# Patient Record
Sex: MALE | Race: AMERICAN INDIAN OR ALASKA NATIVE | URBAN - NONMETROPOLITAN AREA
[De-identification: names, ages, dates, MRNs, and addresses within clinical notes are randomized per-mention and may not be internally consistent; named-entity substitution may affect disease eponyms.]

---

## 2017-08-22 ENCOUNTER — TRANSFERRED RECORDS (OUTPATIENT)
Dept: HEALTH INFORMATION MANAGEMENT | Facility: HOSPITAL | Age: 32
End: 2017-08-22

## 2017-10-03 ENCOUNTER — TRANSFERRED RECORDS (OUTPATIENT)
Dept: HEALTH INFORMATION MANAGEMENT | Facility: HOSPITAL | Age: 32
End: 2017-10-03

## 2017-10-04 ENCOUNTER — TRANSFERRED RECORDS (OUTPATIENT)
Dept: HEALTH INFORMATION MANAGEMENT | Facility: HOSPITAL | Age: 32
End: 2017-10-04

## 2017-10-04 ENCOUNTER — HOSPITAL ENCOUNTER (INPATIENT)
Facility: HOSPITAL | Age: 32
LOS: 5 days | Discharge: HOME | End: 2017-10-09
Attending: PSYCHIATRY & NEUROLOGY | Admitting: PSYCHIATRY & NEUROLOGY
Payer: MEDICAID

## 2017-10-04 DIAGNOSIS — F20.1 DISORGANIZED SCHIZOPHRENIA (H): Primary | ICD-10-CM

## 2017-10-04 PROBLEM — F29 PSYCHOSIS (H): Status: ACTIVE | Noted: 2017-10-04

## 2017-10-04 PROCEDURE — 99223 1ST HOSP IP/OBS HIGH 75: CPT | Performed by: NURSE PRACTITIONER

## 2017-10-04 PROCEDURE — 12400000 ZZH R&B MH

## 2017-10-04 PROCEDURE — 25000132 ZZH RX MED GY IP 250 OP 250 PS 637: Performed by: NURSE PRACTITIONER

## 2017-10-04 RX ORDER — OLANZAPINE 10 MG/2ML
10 INJECTION, POWDER, FOR SOLUTION INTRAMUSCULAR 3 TIMES DAILY PRN
Status: DISCONTINUED | OUTPATIENT
Start: 2017-10-04 | End: 2017-10-09 | Stop reason: HOSPADM

## 2017-10-04 RX ORDER — ARIPIPRAZOLE 10 MG/1
10 TABLET ORAL DAILY
Status: DISCONTINUED | OUTPATIENT
Start: 2017-10-04 | End: 2017-10-05

## 2017-10-04 RX ORDER — TRAZODONE HYDROCHLORIDE 50 MG/1
50 TABLET, FILM COATED ORAL
Status: DISCONTINUED | OUTPATIENT
Start: 2017-10-04 | End: 2017-10-09 | Stop reason: HOSPADM

## 2017-10-04 RX ORDER — DIPHENHYDRAMINE HCL 50 MG
50 CAPSULE ORAL EVERY 6 HOURS PRN
Status: DISCONTINUED | OUTPATIENT
Start: 2017-10-04 | End: 2017-10-08

## 2017-10-04 RX ORDER — RISPERIDONE 2 MG/1
4 TABLET ORAL AT BEDTIME
Status: DISCONTINUED | OUTPATIENT
Start: 2017-10-04 | End: 2017-10-04

## 2017-10-04 RX ORDER — BISACODYL 10 MG
10 SUPPOSITORY, RECTAL RECTAL DAILY PRN
Status: DISCONTINUED | OUTPATIENT
Start: 2017-10-04 | End: 2017-10-09 | Stop reason: HOSPADM

## 2017-10-04 RX ORDER — HYDROXYZINE HYDROCHLORIDE 25 MG/1
25-50 TABLET, FILM COATED ORAL EVERY 4 HOURS PRN
Status: DISCONTINUED | OUTPATIENT
Start: 2017-10-04 | End: 2017-10-09 | Stop reason: HOSPADM

## 2017-10-04 RX ORDER — ACETAMINOPHEN 325 MG/1
650 TABLET ORAL EVERY 4 HOURS PRN
Status: DISCONTINUED | OUTPATIENT
Start: 2017-10-04 | End: 2017-10-09 | Stop reason: HOSPADM

## 2017-10-04 RX ORDER — OLANZAPINE 10 MG/1
10 TABLET ORAL 3 TIMES DAILY PRN
Status: DISCONTINUED | OUTPATIENT
Start: 2017-10-04 | End: 2017-10-09 | Stop reason: HOSPADM

## 2017-10-04 RX ORDER — ALUMINA, MAGNESIA, AND SIMETHICONE 2400; 2400; 240 MG/30ML; MG/30ML; MG/30ML
30 SUSPENSION ORAL EVERY 4 HOURS PRN
Status: DISCONTINUED | OUTPATIENT
Start: 2017-10-04 | End: 2017-10-09 | Stop reason: HOSPADM

## 2017-10-04 RX ADMIN — RISPERIDONE 3 MG: 2 TABLET, FILM COATED ORAL at 20:21

## 2017-10-04 RX ADMIN — ARIPIPRAZOLE 10 MG: 10 TABLET ORAL at 15:33

## 2017-10-04 RX ADMIN — OLANZAPINE 10 MG: 10 TABLET, FILM COATED ORAL at 11:44

## 2017-10-04 RX ADMIN — RISPERIDONE 3 MG: 2 TABLET, FILM COATED ORAL at 13:52

## 2017-10-04 ASSESSMENT — ACTIVITIES OF DAILY LIVING (ADL)
BATHING: 0-->INDEPENDENT
FALL_HISTORY_WITHIN_LAST_SIX_MONTHS: NO
AMBULATION: 0-->INDEPENDENT
ORAL_HYGIENE: INDEPENDENT
TOILETING: 0-->INDEPENDENT
GROOMING: INDEPENDENT
DRESS: INDEPENDENT;SCRUBS (BEHAVIORAL HEALTH)
DRESS: 0-->INDEPENDENT
RETIRED_EATING: 0-->INDEPENDENT
SWALLOWING: 0-->SWALLOWS FOODS/LIQUIDS WITHOUT DIFFICULTY
RETIRED_COMMUNICATION: 0-->UNDERSTANDS/COMMUNICATES WITHOUT DIFFICULTY
COGNITION: 0 - NO COGNITION ISSUES REPORTED
TRANSFERRING: 0-->INDEPENDENT
LAUNDRY: UNABLE TO COMPLETE

## 2017-10-04 NOTE — PLAN OF CARE
Social Service Psychosocial Assessment  Presenting Problem:   Patient was brought to the ED by PO from [facility]. Pt has been having auditory hallucinations for the past 2 years. Voices are telling him to do harmful things to others and that he is going to die. Pt had monopoly money on him as part of his delusions. Per ED notes from what pt told his PO- pt counts the monopoly money because someone killed the monopoly man, but no one can tell anyone that he is actually alive. Pt told ED that he understands the voices aren't real but feels he has to follow the instructions, pt denied SI.   Attempted to assess pt this afternoon and was unsuccessful. All other information was taken from previous records and pt's chart. Did have pt sign multiple releases for PO, , [facility], and CADT.    Marital Status:   Single   Spouse / Children:    4 children and one on the way- All live with their mothers. Previously states the oldest lives in [place] and the youngest two live in [place].  Psychiatric [place] HX: Pt was hospitalized here in March of 2015. 3 previous hospitalizations, one being at [facility] 3 years ago- Admits to past civil commitments. Previously diagnosed on 8-22 with social anxiety, schizoaffective disorder, ETOH, and cannabis use. History of command hallucinations that tell him to harm himself and others. History of overdosing on pills. ED notes state pt stated everything started when he was 12 years old and he pretended he had an invisible friend.   Suicide Risk Assessment: Pt denies SI on admission- Was admitted with voices telling him to do harmful things to others and that he is going to die. Pt denies any history of suicide attempts. Denies any family history of suicide. Denies SI today.   Access to Lethal Means (explain):   Denies access to lethal means  Family Psych HX:   None reported   A & Ox:   x3  Medication Adherence:   Unknown   Medical Issues:   See H&P  Visual  Motor Functioning:   Okay    Communication Skills /Needs:   Okay   Ethnicity:    or      Spirituality/Hindu Affiliation:    Native spirituality   Clergy Request:   No   History:   None reported   Living Situation: Has been staying with his mom in [place]   ADL s:  Independent   Education:  Graduated HS  Financial Situation:   Unknown   Occupation:  Unemployed   Leisure & Recreation:  Unknown   Childhood History:   Grew up in [place], mostly with his father and 3 siblings. States that he had a good childhood.  Trauma Abuse HX:   None reported   Relationship / Sexuality:   None reported   Substance Use/ Abuse:  Admits to daily marijuana use. History of alcohol use- last drink being 2 years ago.   Chemical Dependency [facility]  Legal Issues: Currently on probation. Pt was in [facility] for 4 months over the summer for burglary and was released 3 weeks ago. Pt's PO reports pt's charge was a felony simple robbery and trespassing. Pt has a history of multiple trespassing charges. Pt absconded after he was charged and was missing for a number of months until found 5 months ago and then was put in [facility].     Significant Life Events:   None reported   Strengths:   Ability to communicate, family support  Challenges /Limitation:   Lack of insight, Mental health   Patient Support Contact (Include name, relationship, number, and summary of conversation):   Pt has a release signed for his mother Daya Carrera at [phone]- ED notes state pt's mother is his legal guardian, if so will get legal paperwork from pt's mother. Called pt's PO Sreedhar [place] and  Yennifer to get fax numbers to send them signed MONICA's- both stated when they receive the MONICA they will call back.   Interventions:       Community-Based Programs- [facility]     Medical/Dental Care- PCP- [facility]- Dr. Colt REESE Evaluation/Rule 25/Aftercare- Would benefit  from    Medication Management- [facility]- DA done 8-22- Dr. Cooper?     Individual Therapy- [facility]- Oziel     Case Management- [facility]- Yennifer Tavera/ P.O.- [facility]- Sreedhar Sidhua- [phone]/ [phone]    Insurance Coverage- None listed, Pt financial attempted to meet with pt this afternoon      Suicide Risk Assessment- Pt denies SI on admission- Was admitted with voices telling him to do harmful things to others and that he is going to die. Pt denies any history of suicide attempts. Denies any family history of suicide. Denies SI today.     High Risk Safety Plan- Talk to supports; Call crisis lines; Go to local ER if feeling suicidal.

## 2017-10-04 NOTE — H&P
"Psychiatric Eval/H&P  Patient Name: Shay Carrera   YOB: 1985  Age: 32 year old  [id number]    Primary Physician: No primary care provider on file.   Completed By: Bryanna Justice NP     CC: \"I don't know\"    HPI   Shay Carrera is a 32 year old single  male who presented via [facility] ER with his PO due to his ongoing bizarre behaviors. Shay has been disorganized behavior, delusional beliefs and hallucinations. Shay believes his father or grandfather own the [employer]. He carries around large amounts of monopoly money and believes that the monopoly man has been killed. Shay is recently out of senior care after serving time for [employer] and [employer]. He lives with his mother who is also reportedly his guardian. Shay is quite sleepy due to getting a medication earlier in the day. This information is taken from his chart. Shay was able to provide some information and utter agreement or clarify briefly with the information.      SPECIFIC SYMPTOM HISTORY  Sleep:no issues .  Recent appetite change: No.  Recent weight change: No.  Special diet: No.  Other nutritional concerns: no.  Psychotic symptoms (subjective): Frequent hallucinations..delusions, auditory hallucinations and visual hallucinationsendorses symptoms of psychosis including delusions believes his father owns a basketball team, hallucinations auditory hallucinations and disorganized behavior     [facility]     Past Psychiatric History: He has been hospitalized in this facility in 2015, he has been in other facilities as well. He may have been under commitment at one time. His past history is significant for schizophrenia and catatonia.  He is currently on Risperdal and has been on zyprexa in the past. Shay does say he does not think the risperdal is working anymore. Shay denies a history of abuse.     Social History: Shay grew up in the [place]. He graduated from high school. He is unemployed. He is on probation. " "He has no  history. He has four children with a fifth one on the way. The children live with their mothers. Cliff tells me he has no contact with his children.  Education, school, occupation, social/peer relations, hobbies/recreational interests,  history, legal history,      Chemical Use History: no significant substance abuse history is reported by cliff. He tells me he has never been to treatment     Family Psychiatric History: Cliff denies any family mental health history, nor could one be verified in collateral data.         Medical History and ROS  Prescription Medications as of 10/4/2017             RisperiDONE (RISPERDAL PO) Take 2 mg by mouth daily Daily in the morning    RisperiDONE (RISPERDAL PO) Take 4 mg by mouth daily Daily at HS    OLANZapine zydis (ZYPREXA) 20 MG disintegrating tablet Take 1 tablet (20 mg) by mouth At Bedtime      Facility Administered Medications as of 10/4/2017             hydrOXYzine (ATARAX) tablet 25-50 mg Take 1-2 tablets (25-50 mg) by mouth every 4 hours as needed for anxiety    acetaminophen (TYLENOL) tablet 650 mg Take 2 tablets (650 mg) by mouth every 4 hours as needed for mild pain    alum & mag hydroxide-simethicone (MYLANTA ES/MAALOX  ES) suspension 30 mL Take 30 mLs by mouth every 4 hours as needed for indigestion    magnesium hydroxide (MILK OF MAGNESIA) suspension 30 mL Take 30 mLs by mouth nightly as needed for constipation    bisacodyl (DULCOLAX) Suppository 10 mg Place 1 suppository (10 mg) rectally daily as needed for constipation    traZODone (DESYREL) tablet 50 mg Take 1 tablet (50 mg) by mouth nightly as needed for sleep    OLANZapine (zyPREXA) tablet 10 mg Take 1 tablet (10 mg) by mouth 3 times daily as needed for agitation (associated with psychosis or odalys)    Linked Group 1:  \"Or\" Linked Group Details     OLANZapine (zyPREXA) injection 10 mg Inject 10 mg into the muscle 3 times daily as needed for agitation (associated with psychosis or " "odalys)    Linked Group 1:  \"Or\" Linked Group Details     nicotine polacrilex (NICORETTE) gum 2-4 mg Place 1-2 each (2-4 mg) inside cheek every hour as needed for other (nicotine withdrawal symptoms)    risperiDONE (risperDAL) tablet 3 mg Take 3 mg by mouth daily    risperiDONE (risperDAL) tablet 4 mg Take 2 tablets (4 mg) by mouth At Bedtime          Allergies   Allergen Reactions     Haldol [Haloperidol] Other (See Comments)     Severe dystonic reaction     Sulfa Drugs      No past medical history on file.  No past surgical history on file.      Physical Exam    Constitutional: oriented to person, place, and time.  appears well-developed and well-nourished.   HENT:   Head: Normocephalic and atraumatic.   Right Ear: External ear normal.   Left Ear: External ear normal.   Nose: Nose normal.   Mouth/Throat: Oropharynx is clear and moist. No oropharyngeal exudate.   Eyes: Conjunctivae and EOM are normal. Pupils are equal, round, and reactive to light. Right eye exhibits no discharge. Left eye exhibits no discharge. No scleral icterus.   Neck: Normal range of motion. Neck supple. No JVD present. No tracheal deviation present. No thyromegaly present.   Cardiovascular: Normal rate, regular rhythm, normal heart sounds and intact distal pulses. Exam reveals no gallop and no friction rub.   No murmur heard.  Pulmonary/Chest: Effort normal and breath sounds normal. No stridor. No respiratory distress.  no wheezes. no rales. no tenderness.   Abdominal: Soft. Bowel sounds are normal.  no distension and no mass. There is no tenderness. There is no rebound and no guarding.  Skin: Dry, intact, no open areas, rashes, moles of concern    Review of Systems:  Constitution: No weight loss, fever, night sweats  Skin: No rashes, pruritus or open wounds  Neuro: No headaches or seizure activity.  Psych:  See HPI  Eyes: No vision changes.  ENT: No problems chewing or swallowing.   Musculoskeletal: No muscle pain, joint pain or swelling " "  Respiratory: No cough or dyspnea  Cardiovascular:  No chest pain,  palpitations or fainting  Gastrointestinal:  No abdominal pain, nausea, vomiting or change in bowel habits         MSE/PSYCH  PSYCHIATRIC EXAM  /78  Pulse 81  Temp 96.8  F (36  C) (Tympanic)  Resp 14  Ht 1.778 m (5' 10\")  Wt 76.2 kg (168 lb)  SpO2 99%  BMI 24.11 kg/m2  -Appearance/Behavior:   No apparent distress and Malodorous  {attitude:cooperative  -Motor: normal or unremarkable.  -Gait: Normal.    -Abnormal involuntary movements: none.  -Mood: grandiose.  -Affect: pt is sleepy.  -Speech: Normal slightly delayed due to sleepiness.                 -Thought process/associations: Linear.  -Thought content: bizarre delusions  Per er report, patient is able to report delusional beliefs.  -Perceptual disturbances: Frequent hallucinations..              -Suicidal/Homicidal Ideation: denies SI/HI  -Judgment: Poor.  -Insight: Fair.  *Orientation: time and person.  *Memory: unable to assess.  *Attention: impaired  *Language: fluent, no aphasias, able to repeat phrases and name objects. Vocab intact.  *Fund of information:  not assessed.  *Cognitive functioning estimate: 1 - slightly impaired.     Labs: labs are unremarkable     Assessment/Impression: This is a 32 year old yo male with a history of schizophrenia. He is a questionable historian and there are some inconsistent reports from what he told the ER and what his PO and mother were able to verify. Shay is cooperative but quite lethargic after getting medicated. He does tell me that he does not think risperdal is working well. He is receptive to changing medications.   Educated regarding medication indications, risks, benefits, side effects, contraindications and possible interactions. Verbally expressed understanding.     DX: schizophrenia     Plan:  Admit to Unit: [address] [place] [address]  Attending: Bryanna Justice, SRI-CNP  Patient is: Voluntary  Monitor for target symptoms: decreased " psychosis  Provide a safe environment and therapeutic milieu.   Re-Start/Start: Start abilify  Taper risperdal    Anticipated length of stay: 3-5 days       Bryanna Justice, APRN-CNP

## 2017-10-04 NOTE — PROGRESS NOTES
List items sent to safe:zero cash, sunglassesAll other belongings put in assigned cubby in belongings room.     I have reviewed my belongings list on admission and verify that it is correct.     Patient signature_______________________________    Second staff witness (if patient unable to sign) ______________________________       I have received all my belongings at discharge.    Patient signature________________________________    Etkjgaw83/4/2017  6:47 AM

## 2017-10-04 NOTE — PROGRESS NOTES
List items sent to safe:zero cash no cell phoneAll other belongings put in assigned cubby in belongings room.     I have reviewed my belongings list on admission and verify that it is correct.     Patient signature_______________________________    Second staff witness (if patient unable to sign) ______________________________       I have received all my belongings at discharge.    Patient signature________________________________    Ytzlxty73/4/2017  6:41 AM

## 2017-10-04 NOTE — PLAN OF CARE
Problem: Patient Care Overview  Goal: Team Discussion  Team Plan:   BEHAVIORAL TEAM DISCUSSION     Participants:  Sixto Wells NP, Charity Rodriguez NP, Bryanna Justice NP, Isa Patel [facility], Bhavna SORTOW, Gloria Rosenbaum RN, Maria Luisa Fletcher RN. Eula [facility], [facility] OT, Carol Childs OT   Progress: new patient  Continued Stay Criteria/Rationale: Psychotic, Looking at starting a long acting injection. Auditory Hallucinations.  Medical/Physical: none known.   Precautions:   Behavioral Orders   Procedures     Code 1 - Restrict to Unit     Routine Programming       As clinically indicated     Self Injury Precaution     Status 15       Every 15 minutes.     Plan: Provided to assess. Plan to contact the mother for collaborative information. Plan to contact PO as well.   Rationale for change in precautions or plan: ok to move out of the back.

## 2017-10-04 NOTE — PLAN OF CARE
"ADMISSION NOTE    Reason for admission Hallucinations/Voices to harm self/others - non-med compliance.  Safety concerns Continues to hear voices.  Risk for or history of violence history of violence - has been calm/polite since admit.   Full skin assessment: skin clear - does have two word tattoo across chest    Patient arrived on unit from [place], [place] accompanied by [facility] on 10/4/2017  6:21 AM.   Status on arrival: Calm, polite - does admit to continues hallucinations  /78  Pulse 81  Temp 96.8  F (36  C) (Tympanic)  Resp 14  Ht 1.778 m (5' 10\")  Wt 76.2 kg (168 lb)  SpO2 99%  BMI 24.11 kg/m2  Patient given tour of unit and Welcome to  unit papers given to patient, wanding completed, belongings inventoried, and admission assessment completed.   Patient's legal status on arrival is Voluntary. Appropriate legal rights discussed with and copy given to patient. Patient Bill of Rights discussed with and copy given to patient.   Patient denies SI, HI, and thoughts of self harm and contracts for safety while on unit.      Mariann Bell  10/4/2017  6:55 AM          "

## 2017-10-04 NOTE — PROGRESS NOTES
10/04/17 0650   Patient Belongings   Did you bring any home meds/supplements to the hospital?  Yes   Disposition of meds  Sent to security/pharmacy per site process   Patient Belongings clothing   Disposition of Belongings soxs,top,cap,shoes,shorts,sunglasses,momopoly money,2 necklace   Belongings Search Yes   Clothing Search Yes   Second Staff afshan   General Info Comment n/a    List items sent to safe:zero cashAll other belongings put in assigned cubby in belongings room.     I have reviewed my belongings list on admission and verify that it is correct.     Patient signature_______________________________    Second staff witness (if patient unable to sign) ______________________________       I have received all my belongings at discharge.    Patient signature________________________________    Jessica Palacios  10/4/2017  6:52 AM

## 2017-10-04 NOTE — PLAN OF CARE
0621--ambulatory to unit accompanied by security and two [employer] officers. Calm , pleasant, cooperative with changing into scrubs. Skin check done with security Babak present. Skin clear; has two large words tattoos on his chest. Pt had a large handfull of Monopoly money in his hand on admission.

## 2017-10-04 NOTE — PLAN OF CARE
Face to face end of shift report received from Maria Luisa LINDO RN. Rounding completed. Patient observed in -ICU in room laying in bed.     Griselda Eckert  10/4/2017  8:30 AM

## 2017-10-04 NOTE — IP AVS SNAPSHOT
[facility]    [address] [place] [address]    Phone:  [phone]    Fax:  [phone]                                       After Visit Summary   10/4/2017    Shay Carrera    MRN: [id number]           After Visit Summary Signature Page     I have received my discharge instructions, and my questions have been answered. I have discussed any challenges I see with this plan with the nurse or doctor.    ..........................................................................................................................................  Patient/Patient Representative Signature      ..........................................................................................................................................  Patient Representative Print Name and Relationship to Patient    ..................................................               ................................................  Date                                            Time    ..........................................................................................................................................  Reviewed by Signature/Title    ...................................................              ..............................................  Date                                                            Time

## 2017-10-04 NOTE — PLAN OF CARE
Face to face end of shift report received from EROS Liz. Rounding completed. Patient observed in bed.     Ruthie Lechuga  10/4/2017  3:39 PM

## 2017-10-04 NOTE — IP AVS SNAPSHOT
MRN:[id number]                      After Visit Summary   10/4/2017    Shay Carrera    MRN: [id number]           Thank you!     Thank you for choosing [facility] for your care. Our goal is always to provide you with excellent care. Hearing back from our patients is one way we can continue to improve our services. Please take a few minutes to complete the written survey that you may receive in the mail after you visit with us. Thank you!        Patient Information     Date Of Birth          1985        Designated Caregiver       Most Recent Value    Caregiver    Will someone help with your care after discharge? yes    Name of designated caregiver Mom    Phone number of caregiver [phone]    Caregiver address [place], [place]      About your hospital stay     You were admitted on:  October 4, 2017 You last received care in the:  [facility]    You were discharged on:  October 9, 2017       Who to Call     For medical emergencies, please call 911.  For non-urgent questions about your medical care, please call your primary care provider or clinic, None          Attending Provider     Provider Specialty    Sreedhar Hall MD Psychiatry    Bryanna Justice NP Licensed Mental Health       Primary Care Provider    Physician No Ref-Primary      Further instructions from your care team       Behavioral Discharge Planning and Instructions    Summary: Shay was admitted to  with Hallucinations/Voices telling him to harm self/others - non-med compliance    Main Diagnosis:  schizophrenia    Major Treatments, Procedures and Findings: Stabilize with medications, connect with community programs.    Symptoms to Report: feeling more aggressive, increased confusion, losing more sleep, mood getting worse or thoughts of suicide    Lifestyle Adjustment: Take all medications as prescribed, meet with doctor/ medication provider, out patient therapist, , and [facility] worker as scheduled. Abstain from  alcohol or any unprescribed drugs.    Psychiatry Follow-up:     [facility]  Therapy- Oziel- October 25th @1:30pm  PCP- Dr. Gregory- October 9th @9am, left message to reschedule    - Yennifer Liang- As arranged [phone]   [address] [facility] [address]  Phone: [phone] Fax: [phone]    [facility] [address]- Fridays   - Sreedhar Alexander- [phone]/ [phone]  [address] [place] [address]  Phone [phone]   Fax- [phone]    [facility]  [address], [address]  [place] [address]  [phone]  Fax: [phone]    [facility]  [address] [place], [place]  Phone - [phone] - Client number  Fax - [phone]      Resources:   Crisis Intervention: 663.483.1588 or 372-387-0321 (TTY: 267.227.2346).  Call anytime for help.  National [place] on Mental Illness (www.mn.osbaldo.org): 305.125.9470 or 335-198-8715.  Alcoholics Anonymous (www.alcoholics-anonymous.org): Check your phone book for your local chapter.  Suicide Awareness Voices of Education (SAVE) (www.save.org): 230-578-RHPJ (5833)  National Suicide Prevention Line (www.mentalhealthmn.org): 053-920-TIKO (1260)  Mental Health Consumer/Survivor Network of [place] (www.mhcsn.net): 914.676.1952 or 115-746-6232  Mental Health Association of [place] (www.mentalhealth.org): 373.988.7028 or 026-304-9816    General Medication Instructions:   See your medication sheet(s) for instructions.   Take all medicines as directed.  Make no changes unless your doctor suggests them.   Go to all your doctor visits.  Be sure to have all your required lab tests. This way, your medicines can be refilled on time.  Do not use any drugs not prescribed by your doctor.  Avoid alcohol.    [place] Area:  [facility], Crisis stabilization [facility]- [phone]  On license of [facility] Crisis Line: 1-703.160.1881  Advocates For Family Peace: 734-7998  Sexual Assault  "Program of [facility]: 013-665-4188 or 1-601.489.2197  [place] [facility] Women[facility] Program: 1-343.688.8723 Ext: 279       Calls answered Mon-Fri-8:00 am--4:30 pm    [place]:  Advocates for Family Peace: 1-640.246.2239  [facility] first call for help: 3-042-880-6353  [facility]:  (414) 322-7108      [place] Area:  Warm Line: 1-693.258.1049       Calls answered Tuesday--Saturday 4:00 pm--10:00 pm  [facility] Crisis Line - [phone]  [facility] 002-430-0919    [place] Statewide:  [place] Crisis and Referral Services: 1-210.460.6796  National Suicide Prevention Lifeline: 4-546-345-TALK (4886)   - tyi2swww- Text  Life  to [address]  First Call for Help: 2-1-1  [facility] Helpline- 6-036-ZLYV-HELP            Pending Results     No orders found from 10/2/2017 to 10/5/2017.            Statement of Approval     Ordered          10/09/17 1113  I have reviewed and agree with all the recommendations and orders detailed in this document.  EFFECTIVE NOW     Approved and electronically signed by:  Bryanna Justice NP             Admission Information     Date & Time Provider Department Dept. Phone    10/4/2017 Bryanna Justice NP [facility] [phone]      Your Vitals Were     Blood Pressure Pulse Temperature Respirations Height Weight    116/74 79 97.5  F (36.4  C) (Tympanic) 16 1.778 m (5' 10\") 75.8 kg (167 lb 1.6 oz)    Pulse Oximetry BMI (Body Mass Index)                100% 23.98 kg/m2          [employer] Information     P[employer]ios lets you send messages to your doctor, view your test results, renew your prescriptions, schedule appointments and more. To sign up, go to [email or web address] . Click on \"Log in\" on the left side of the screen, which will take you to the Welcome page. Then click on \"Sign up Now\" on the right side of the page.     You will be asked to enter the access code listed below, as well as some personal information. Please follow the directions to " create your username and password.     Your access code is: [email or web address]  Expires: 2018 10:57 AM     Your access code will  in 90 days. If you need help or a new code, please call your [facility] clinic or [phone].        Care EveryWhere ID     This is your Care EveryWhere ID. This could be used by other organizations to access your [facility] medical records  [id number]        Equal Access to Services     [facility] : Hadii aad ku hadasho Soomaali, waaxda luqadaha, qaybta kaalmada adeegyada, waxay idiin hayaan adeeg mickjennacolette lajorge . So [facility] [phone].    ATENCIÓN: Si habla español, tiene a alfredo disposición servicios gratuitos de asistencia lingüística. Lb al [phone].    We comply with applicable federal civil rights laws and [place] laws. We do not discriminate on the basis of race, color, national origin, age, disability, sex, sexual orientation, or gender identity.               Review of your medicines      START taking        Dose / Directions    ARIPiprazole 20 MG tablet   Commonly known as:  ABILIFY   Used for:  Disorganized schizophrenia (H)        Dose:  20 mg   Take 1 tablet (20 mg) by mouth daily   Quantity:  30 tablet   Refills:  0       benztropine 0.5 MG tablet   Commonly known as:  COGENTIN   Used for:  Disorganized schizophrenia (H)        Dose:  0.5 mg   Take 1 tablet (0.5 mg) by mouth 2 times daily   Quantity:  60 tablet   Refills:  0         STOP taking     RISPERDAL PO                Where to get your medicines      These medications were sent to [facility] - [facility] - [address]  [address] [address]     Phone:  [phone]     ARIPiprazole 20 MG tablet    benztropine 0.5 MG tablet                Protect others around you: Learn how to safely use, store and throw away your medicines at www.disposemymeds.org.             Medication List: This is a list of all your medications and when to take them. Check marks below indicate your  daily home schedule. Keep this list as a reference.      Medications           Morning Afternoon Evening Bedtime As Needed    ARIPiprazole 20 MG tablet   Commonly known as:  ABILIFY   Take 1 tablet (20 mg) by mouth daily   Last time this was given:  20 mg on 10/9/2017  8:10 AM                                benztropine 0.5 MG tablet   Commonly known as:  COGENTIN   Take 1 tablet (0.5 mg) by mouth 2 times daily   Last time this was given:  0.5 mg on 10/9/2017  8:10 AM

## 2017-10-04 NOTE — PLAN OF CARE
Problem: Patient Care Overview  Goal: Individualization & Mutuality  Patient will have a realistic conversation with nurse by discharge  Patient will attend 50% fo groups daily by discharge.  Patient will not display aggressive behaviors throughout hospitalization.   Pt isolates to room much of this shift. He denies SI, HI, and hallucinations. Though he does appear to be responding to internal stimuli. He is compliant with prescribed medication. Doesn't offer much information during assessment. Answers with yes or no. Denies anxiety, depression and pain. Will continue to monitor.
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
## 2017-10-05 PROCEDURE — 99233 SBSQ HOSP IP/OBS HIGH 50: CPT | Performed by: NURSE PRACTITIONER

## 2017-10-05 PROCEDURE — 12400000 ZZH R&B MH

## 2017-10-05 PROCEDURE — 25000132 ZZH RX MED GY IP 250 OP 250 PS 637: Performed by: NURSE PRACTITIONER

## 2017-10-05 RX ORDER — ARIPIPRAZOLE 15 MG/1
15 TABLET ORAL DAILY
Status: DISCONTINUED | OUTPATIENT
Start: 2017-10-06 | End: 2017-10-06

## 2017-10-05 RX ORDER — RISPERIDONE 2 MG/1
2 TABLET ORAL 2 TIMES DAILY
Status: DISCONTINUED | OUTPATIENT
Start: 2017-10-05 | End: 2017-10-06

## 2017-10-05 RX ADMIN — ARIPIPRAZOLE 10 MG: 10 TABLET ORAL at 08:05

## 2017-10-05 RX ADMIN — RISPERIDONE 2 MG: 2 TABLET, FILM COATED ORAL at 20:14

## 2017-10-05 RX ADMIN — RISPERIDONE 3 MG: 2 TABLET, FILM COATED ORAL at 08:06

## 2017-10-05 ASSESSMENT — ACTIVITIES OF DAILY LIVING (ADL)
DRESS: INDEPENDENT;SCRUBS (BEHAVIORAL HEALTH)
GROOMING: INDEPENDENT
GROOMING: INDEPENDENT
LAUNDRY: UNABLE TO COMPLETE
DRESS: INDEPENDENT;SCRUBS (BEHAVIORAL HEALTH)
ORAL_HYGIENE: INDEPENDENT
LAUNDRY: UNABLE TO COMPLETE
ORAL_HYGIENE: INDEPENDENT

## 2017-10-05 NOTE — PLAN OF CARE
Problem: Patient Care Overview  Goal: Individualization & Mutuality  Patient will have a realistic conversation with nurse by discharge  Patient will attend 50% fo groups daily by discharge.  Patient will not display aggressive behaviors throughout hospitalization.  Patient will sleep between 6 and 8 hours a noc   Pt is pleasant and cooperative with writer this shift.  Pt makes good eye contact during nursing assessment.  Denies all criteria this shift, but dose appear to be responding to internal stimuli.  Pt compliant with all medications.  Pt has been isolating to himself.  Did encourage to attend groups this shift. Has been in lounge most of the shift shuffling through [employer] money.  When asked about this he states I like this game.

## 2017-10-05 NOTE — PLAN OF CARE
Face to face end of shift report received from Mariann VARGAS RN. Rounding completed. Patient observed in [facility].     Tena Roberts  10/5/2017  9:32 AM

## 2017-10-05 NOTE — PROGRESS NOTES
[facility]  Psychiatric Progress Note      Impression:   This is a 32 year old male with a history of schizophrenia. Shay is pleasant and cooperative with assessment today. However, he is brief in his answers and somewhat delayed. Shay continues to endorse both auditory and visual hallucinations. He is responding to the stimuli as he was heard talking prior to me entering the room. He denies any command hallucinations but still say they are bothersome. Shay is delayed in his responses.       Educated regarding medication indications, risks, benefits, side effects, contraindications and possible interactions. Verbally expressed understanding.        DIagnoses:     schizophrenia    Attestation:  Patient has been seen and evaluated by me,  Bryanna Justice NP          Interim History:   The patient's care was discussed with the treatment team and chart notes were reviewed.          Medications:     Current Facility-Administered Medications Ordered in Epic   Medication Dose Route Frequency Last Rate Last Dose     hydrOXYzine (ATARAX) tablet 25-50 mg  25-50 mg Oral Q4H PRN         acetaminophen (TYLENOL) tablet 650 mg  650 mg Oral Q4H PRN         alum & mag hydroxide-simethicone (MYLANTA ES/MAALOX  ES) suspension 30 mL  30 mL Oral Q4H PRN         magnesium hydroxide (MILK OF MAGNESIA) suspension 30 mL  30 mL Oral At Bedtime PRN         bisacodyl (DULCOLAX) Suppository 10 mg  10 mg Rectal Daily PRN         traZODone (DESYREL) tablet 50 mg  50 mg Oral At Bedtime PRN         OLANZapine (zyPREXA) tablet 10 mg  10 mg Oral TID PRN   10 mg at 10/04/17 1144    Or     OLANZapine (zyPREXA) injection 10 mg  10 mg Intramuscular TID PRN         nicotine polacrilex (NICORETTE) gum 2-4 mg  2-4 mg Buccal Q1H PRN         risperiDONE (risperDAL) tablet 3 mg  3 mg Oral BID   3 mg at 10/05/17 0806     ARIPiprazole (ABILIFY) tablet 10 mg  10 mg Oral Daily   10 mg at 10/05/17 0805     diphenhydrAMINE (BENADRYL) capsule 50 mg   "50 mg Oral Q6H PRN         No current Epic-ordered outpatient prescriptions on file.              10 point ROS negative        Allergies:     Allergies   Allergen Reactions     Haldol [Haloperidol] Other (See Comments)     Severe dystonic reaction     Sulfa Drugs             Psychiatric Examination:   /73  Pulse 84  Temp 96.5  F (35.8  C) (Tympanic)  Resp 14  Ht 1.778 m (5' 10\")  Wt 76.2 kg (168 lb)  SpO2 99%  BMI 24.11 kg/m2  Weight is 168 lbs 0 oz  Body mass index is 24.11 kg/(m^2).    Appearance:  awake, alert, adequately groomed and dressed in hospital scrubs  Attitude:  guarded  Eye Contact:  fair  Mood:  good  Affect:  intensity is blunted  Speech:  clear, coherent  Psychomotor Behavior:  no evidence of tardive dyskinesia, dystonia, or tics and intact station, gait and muscle tone  Thought Process:  evidence of thought blocking present  Associations:  no loose associations  Thought Content:  auditory hallucinations present, visual hallucinations present and patient appears to be responding to internal stimuli  Insight:  fair  Judgment:  fair  Oriented to:  time, person, and place  Attention Span and Concentration:  fair  Recent and Remote Memory:  fair  Fund of Knowledge: low-normal  Muscle Strength and Tone: normal  Gait and Station: Normal           Labs:   No results found for this or any previous visit (from the past 48 hour(s)).             Plan:   Continue adjust abilify  Continue to taper risperdal  "

## 2017-10-05 NOTE — PLAN OF CARE
Problem: Patient Care Overview  Goal: Individualization & Mutuality  Patient will have a realistic conversation with nurse by discharge  Patient will attend 50% fo groups daily by discharge.  Patient will not display aggressive behaviors throughout hospitalization.  Patient will sleep between 6 and 8 hours a noc   Outcome: Improving  Slept well this noc - was up one time to use the bathroom - pleasant, polite and appropriate with staff.

## 2017-10-05 NOTE — PLAN OF CARE
Problem: Patient Care Overview  Goal: Team Discussion  Team Plan:   BEHAVIORAL TEAM DISCUSSION     Participants: Julia Tiwari NP, Bryanna Justice NP, Isa Patel [facility], Bhavna SORTOW,  Gloria Rosenbaum RN, Savita Lao RN, Eula Kothari Recreation   Progress: None  Continued Stay Criteria/Rationale: Continued decompinsation. Switching to invega  Medical/Physical:None   Precautions:   Behavioral Orders   Procedures     Code 1 - Restrict to Unit     Routine Programming       As clinically indicated     Status 15       Every 15 minutes.     Plan: Stabilize on medications  Rationale for change in precautions or plan: None

## 2017-10-05 NOTE — PLAN OF CARE
Face to face end of shift report received from EROS Madsen. Rounding completed. Patient observed in [facility].     Ruthie Lechuga  10/5/2017  3:50 PM

## 2017-10-05 NOTE — PLAN OF CARE
Face to face end of shift report received from EROS Hendricks. Rounding completed. Patient observed. Lying in prone position - eyes closed - audible snoring -     Mariann Bell  10/5/2017  1:21 AM

## 2017-10-05 NOTE — PLAN OF CARE
"Spoke with pt's P.OTanisha Eli And informed him pt has signed the releases that were asked, also gave him an update on pt, and informed him the providers are working on stabilization and the plan when pt is stable to discharge him home with his mom and continue outpt services. Also spoke with pt's mom Aubrie and asked if she had the court paperwork stating she is pt's legal guardian, she states she didn't actually go to court for guardianship and does not have this paperwork, but \"I'm his mother.\" Talked with Aubrie about the discharge plan and answered questions that she had- encouraged Aubrie to call the unit with any other questions.   "

## 2017-10-05 NOTE — PLAN OF CARE
"Problem: Patient Care Overview  Goal: Individualization & Mutuality  Patient will have a realistic conversation with nurse by discharge  Patient will attend 50% fo groups daily by discharge.  Patient will not display aggressive behaviors throughout hospitalization.  Patient will sleep between 6 and 8 hours a noc   Pt denies SI and HI. He has been sitting out in the lounge socializing with peers. He is pleasant and cooperative with nursing assessment. Does endorse in auditory hallucinations which he says \"tell me to count this money.\" Pt is seen fidgeting with [employer] money during conversation. He is delayed. Denies anxiety and depression. Endorses in pain in thighs but declines intervention.       "

## 2017-10-06 PROCEDURE — 25000132 ZZH RX MED GY IP 250 OP 250 PS 637: Performed by: NURSE PRACTITIONER

## 2017-10-06 PROCEDURE — 12400000 ZZH R&B MH

## 2017-10-06 PROCEDURE — 99232 SBSQ HOSP IP/OBS MODERATE 35: CPT | Performed by: NURSE PRACTITIONER

## 2017-10-06 RX ORDER — BENZTROPINE MESYLATE 0.5 MG/1
0.5 TABLET ORAL 2 TIMES DAILY
Status: DISCONTINUED | OUTPATIENT
Start: 2017-10-06 | End: 2017-10-09 | Stop reason: HOSPADM

## 2017-10-06 RX ORDER — ARIPIPRAZOLE 10 MG/1
20 TABLET ORAL DAILY
Status: DISCONTINUED | OUTPATIENT
Start: 2017-10-07 | End: 2017-10-09 | Stop reason: HOSPADM

## 2017-10-06 RX ORDER — RISPERIDONE 1 MG/1
1 TABLET ORAL 2 TIMES DAILY
Status: DISCONTINUED | OUTPATIENT
Start: 2017-10-06 | End: 2017-10-07

## 2017-10-06 RX ADMIN — NICOTINE POLACRILEX 4 MG: 2 GUM, CHEWING ORAL at 12:28

## 2017-10-06 RX ADMIN — BENZTROPINE MESYLATE 0.5 MG: 0.5 TABLET ORAL at 20:26

## 2017-10-06 RX ADMIN — RISPERIDONE 1 MG: 1 TABLET ORAL at 20:27

## 2017-10-06 RX ADMIN — ARIPIPRAZOLE 15 MG: 15 TABLET ORAL at 08:07

## 2017-10-06 RX ADMIN — BENZTROPINE MESYLATE 0.5 MG: 0.5 TABLET ORAL at 09:24

## 2017-10-06 RX ADMIN — RISPERIDONE 2 MG: 2 TABLET, FILM COATED ORAL at 08:07

## 2017-10-06 RX ADMIN — ACETAMINOPHEN 650 MG: 325 TABLET, FILM COATED ORAL at 15:39

## 2017-10-06 ASSESSMENT — ACTIVITIES OF DAILY LIVING (ADL)
GROOMING: INDEPENDENT
ORAL_HYGIENE: INDEPENDENT
DRESS: INDEPENDENT
LAUNDRY: UNABLE TO COMPLETE

## 2017-10-06 NOTE — PLAN OF CARE
Face to face end of shift report received from Griselda ORTIZ RN. Rounding completed. Patient observed in the lounge.     Nitesh Vallejo  10/6/2017  4:17 PM

## 2017-10-06 NOTE — PLAN OF CARE
Problem: Patient Care Overview  Goal: Team Discussion  Team Plan:   BEHAVIORAL TEAM DISCUSSION     Participants: Sixto Wells NP, Bryanna Justice NP, Bhavna SORTOW, Cecilia Patiño Discharge Plannner, Gloria Rosenbaum RN, Randa RN, Shira Canales OT, Irma Helms OT   Progress: fair  Continued Stay Criteria/Rationale: increase abilify DC risperdal  Medical/Physical: none  Precautions:   Behavioral Orders   Procedures     Code 1 - Restrict to Unit     Routine Programming       As clinically indicated     Status 15       Every 15 minutes.     Plan: DC Monday or Tuesday.   Rationale for change in precautions or plan: none

## 2017-10-06 NOTE — PROGRESS NOTES
"[facility]  Psychiatric Progress Note      Impression:   This is a 32 year old male with a history of schizophrenia. Shay is up and about on the unit. He is cooperative with assessment. He tells me that he is more clear and feels more \"aligned\" than he did on admit. He feels the auditory hallucinations have decreased a bit. However, his responses are delayed and he looks about the room as though he is watching something. Shay does not report any muscle soreness or stiffness to me but has c/o of it to the nurses. Cogentin will be started.        Educated regarding medication indications, risks, benefits, side effects, contraindications and possible interactions. Verbally expressed understanding.        DIagnoses:     schizophrenia    Attestation:  Patient has been seen and evaluated by me,  Bryanna Justice NP          Interim History:   The patient's care was discussed with the treatment team and chart notes were reviewed.          Medications:     Current Facility-Administered Medications Ordered in Epic   Medication Dose Route Frequency Last Rate Last Dose     hydrOXYzine (ATARAX) tablet 25-50 mg  25-50 mg Oral Q4H PRN         acetaminophen (TYLENOL) tablet 650 mg  650 mg Oral Q4H PRN         alum & mag hydroxide-simethicone (MYLANTA ES/MAALOX  ES) suspension 30 mL  30 mL Oral Q4H PRN         magnesium hydroxide (MILK OF MAGNESIA) suspension 30 mL  30 mL Oral At Bedtime PRN         bisacodyl (DULCOLAX) Suppository 10 mg  10 mg Rectal Daily PRN         traZODone (DESYREL) tablet 50 mg  50 mg Oral At Bedtime PRN         OLANZapine (zyPREXA) tablet 10 mg  10 mg Oral TID PRN   10 mg at 10/04/17 1144    Or     OLANZapine (zyPREXA) injection 10 mg  10 mg Intramuscular TID PRN         nicotine polacrilex (NICORETTE) gum 2-4 mg  2-4 mg Buccal Q1H PRN         risperiDONE (risperDAL) tablet 3 mg  3 mg Oral BID   3 mg at 10/05/17 0806     ARIPiprazole (ABILIFY) tablet 10 mg  10 mg Oral Daily   10 mg at " "10/05/17 0805     diphenhydrAMINE (BENADRYL) capsule 50 mg  50 mg Oral Q6H PRN         No current Epic-ordered outpatient prescriptions on file.              10 point ROS negative        Allergies:     Allergies   Allergen Reactions     Haldol [Haloperidol] Other (See Comments)     Severe dystonic reaction     Sulfa Drugs             Psychiatric Examination:   /71  Pulse 76  Temp 97.3  F (36.3  C) (Tympanic)  Resp 14  Ht 1.778 m (5' 10\")  Wt 76.2 kg (168 lb)  SpO2 98%  BMI 24.11 kg/m2  Weight is 168 lbs 0 oz  Body mass index is 24.11 kg/(m^2).    Appearance:  awake, alert, adequately groomed and dressed in hospital scrubs  Attitude:  guarded  Eye Contact:  fair  Mood:  good  Affect:  intensity is blunted  Speech:  clear, coherent  Psychomotor Behavior:  no evidence of tardive dyskinesia, dystonia, or tics and intact station, gait and muscle tone  Thought Process:  evidence of thought blocking present  Associations:  no loose associations  Thought Content: denies hallucinations but appears to be responding to internal stimuli  Insight:  fair  Judgment:  fair  Oriented to:  time, person, and place  Attention Span and Concentration:  fair  Recent and Remote Memory:  fair  Fund of Knowledge: low-normal  Muscle Strength and Tone: normal  Gait and Station: Normal           Labs:   No results found for this or any previous visit (from the past 48 hour(s)).             Plan:   Increase ablify to 20 mg daily  Discontinue risperdal  Start cogentin  "

## 2017-10-06 NOTE — PLAN OF CARE
Face to face end of shift report received from EROS Hendricks. Rounding completed. Patient observed. Lying in supine position - eyes closed - non-labored breathing.    Mariann Bell  10/6/2017  1:24 AM

## 2017-10-06 NOTE — PLAN OF CARE
Problem: Patient Care Overview  Goal: Individualization & Mutuality  Patient will have a realistic conversation with nurse by discharge  Patient will attend 50% fo groups daily by discharge.  Patient will not display aggressive behaviors throughout hospitalization.  Patient will sleep between 6 and 8 hours a noc   Outcome: Improving  Slept well this noc - up once for a cup of juice and returned to bed - pleasant - polite and appropriate.

## 2017-10-06 NOTE — PLAN OF CARE
"Problem: Patient Care Overview  Goal: Individualization & Mutuality  Patient will have a realistic conversation with nurse by discharge  Patient will attend 50% fo groups daily by discharge.  Patient will not display aggressive behaviors throughout hospitalization.  Patient will sleep between 6 and 8 hours a noc   Outcome: Improving  Pt is able to have a short conversation with this writer. Pt states \"when do I get to leave... Do I get to leave.\" Nurse informed pt that I would speak to a provider to inform them he is interested in discharge.\" Pt states he is not having hallucinations anymore. Pt denies all criteria.  Pt in the lounge area counting pieces from games, he also brought his garbage bag out of his room and set it on the table.\" Nurse asked patient to keep garbage off the tables due to sanitary reasons, also asked that he keeps his garbage bag in his room. Pt did bring bag back to his room.  Pt sits in the lounge area withdrawn from others throughout the day. Pt does answer others when questions are asked but does not elaborate.       "

## 2017-10-06 NOTE — PLAN OF CARE
Spoke with pt this afternoon- he states he feels like he is doing better. Denies SI. Asks about discharge and informed him the treatment team talked about discharge early next week. Pt denies any other questions or concerns at this time.

## 2017-10-06 NOTE — PLAN OF CARE
Face to face end of shift report received from Mariann VARGAS RN. Rounding completed. Patient observed in [facility].     Griselda Eckert  10/6/2017  7:45 AM

## 2017-10-07 PROCEDURE — 25000132 ZZH RX MED GY IP 250 OP 250 PS 637: Performed by: NURSE PRACTITIONER

## 2017-10-07 PROCEDURE — 12400000 ZZH R&B MH

## 2017-10-07 PROCEDURE — 99232 SBSQ HOSP IP/OBS MODERATE 35: CPT | Performed by: NURSE PRACTITIONER

## 2017-10-07 RX ORDER — RISPERIDONE 1 MG/1
1 TABLET ORAL AT BEDTIME
Status: DISCONTINUED | OUTPATIENT
Start: 2017-10-07 | End: 2017-10-07

## 2017-10-07 RX ADMIN — ACETAMINOPHEN 650 MG: 325 TABLET, FILM COATED ORAL at 19:03

## 2017-10-07 RX ADMIN — RISPERIDONE 1 MG: 1 TABLET ORAL at 08:49

## 2017-10-07 RX ADMIN — NICOTINE POLACRILEX 4 MG: 2 GUM, CHEWING ORAL at 09:33

## 2017-10-07 RX ADMIN — NICOTINE POLACRILEX 4 MG: 2 GUM, CHEWING ORAL at 14:30

## 2017-10-07 RX ADMIN — BENZTROPINE MESYLATE 0.5 MG: 0.5 TABLET ORAL at 08:49

## 2017-10-07 RX ADMIN — NICOTINE POLACRILEX 4 MG: 2 GUM, CHEWING ORAL at 17:13

## 2017-10-07 RX ADMIN — ACETAMINOPHEN 650 MG: 325 TABLET, FILM COATED ORAL at 15:00

## 2017-10-07 RX ADMIN — BENZTROPINE MESYLATE 0.5 MG: 0.5 TABLET ORAL at 20:03

## 2017-10-07 RX ADMIN — ARIPIPRAZOLE 20 MG: 10 TABLET ORAL at 08:49

## 2017-10-07 ASSESSMENT — ACTIVITIES OF DAILY LIVING (ADL)
DRESS: SCRUBS (BEHAVIORAL HEALTH);INDEPENDENT
GROOMING: INDEPENDENT
ORAL_HYGIENE: INDEPENDENT
LAUNDRY: UNABLE TO COMPLETE
DRESS: SCRUBS (BEHAVIORAL HEALTH)
LAUNDRY: UNABLE TO COMPLETE
ORAL_HYGIENE: INDEPENDENT
GROOMING: INDEPENDENT

## 2017-10-07 NOTE — PLAN OF CARE
.Face to face end of shift report received from EROS Dowling. Rounding completed. Patient observed. Lying on right side - eyes closed - non-labored breathing noted.    Mariann Bell  10/7/2017  12:26 AM

## 2017-10-07 NOTE — PLAN OF CARE
Problem: Patient Care Overview  Goal: Individualization & Mutuality  Patient will have a realistic conversation with nurse by discharge  Patient will attend 50% fo groups daily by discharge.  Patient will not display aggressive behaviors throughout hospitalization.  Patient will sleep between 6 and 8 hours a noc   Outcome: No Change  Patient rated his anxiety and depression at 7 of 10. He denied HI/SI and any hallucinations. This writer did not observe any signs that patient was hallucinating. Patient isolated to his room and when he did come out to the lounge, he did not interact with peers. Patient complained of pain when asked and said he was having patient at 9 of 10 in his lower back. He received 650mg of Tylenol at 15:39 which he stated helped. Patient did not attend groups.

## 2017-10-07 NOTE — PLAN OF CARE
"Problem: Patient Care Overview  Goal: Individualization & Mutuality  Patient will have a realistic conversation with nurse by discharge  Patient will attend 50% fo groups daily by discharge.  Patient will not display aggressive behaviors throughout hospitalization.  Patient will sleep between 6 and 8 hours a noc   Outcome: Declining  This morning pt awake in the [facility] area - and has been staring at one of our young - quite attractive- female staff - he goes from window to window and the door window to be able to see her - and smiles broadly at her - staff member feels \"hes creepy\" we are keeping her in quieter place in the nurses station - notified day staff - monitor for inappropriate behavior.      "

## 2017-10-07 NOTE — PROGRESS NOTES
[facility]  Psychiatric Progress Note      Impression:   This is a 32 year old male with a history of schizophrenia. Shay is up and about on the unit. He has some social interaction with peers. He is actually spontaneous in conversation with staff. Shay repoorts he is sleeping well and has decreased auditory hallucinations and feels his thoughts have improved. Will continue with abilify. Consider discharging Monday should symptoms continue to remains table or show improvement.        Educated regarding medication indications, risks, benefits, side effects, contraindications and possible interactions. Verbally expressed understanding.        DIagnoses:     schizophrenia    Attestation:  Patient has been seen and evaluated by me,  Bryanna Justice NP          Interim History:   The patient's care was discussed with the treatment team and chart notes were reviewed.          Medications:     Current Facility-Administered Medications Ordered in Epic   Medication Dose Route Frequency Last Rate Last Dose     hydrOXYzine (ATARAX) tablet 25-50 mg  25-50 mg Oral Q4H PRN         acetaminophen (TYLENOL) tablet 650 mg  650 mg Oral Q4H PRN         alum & mag hydroxide-simethicone (MYLANTA ES/MAALOX  ES) suspension 30 mL  30 mL Oral Q4H PRN         magnesium hydroxide (MILK OF MAGNESIA) suspension 30 mL  30 mL Oral At Bedtime PRN         bisacodyl (DULCOLAX) Suppository 10 mg  10 mg Rectal Daily PRN         traZODone (DESYREL) tablet 50 mg  50 mg Oral At Bedtime PRN         OLANZapine (zyPREXA) tablet 10 mg  10 mg Oral TID PRN   10 mg at 10/04/17 1144    Or     OLANZapine (zyPREXA) injection 10 mg  10 mg Intramuscular TID PRN         nicotine polacrilex (NICORETTE) gum 2-4 mg  2-4 mg Buccal Q1H PRN         risperiDONE (risperDAL) tablet 3 mg  3 mg Oral BID   3 mg at 10/05/17 0806     ARIPiprazole (ABILIFY) tablet 10 mg  10 mg Oral Daily   10 mg at 10/05/17 0805     diphenhydrAMINE (BENADRYL) capsule 50 mg  50 mg  "Oral Q6H PRN         No current Epic-ordered outpatient prescriptions on file.              10 point ROS negative        Allergies:     Allergies   Allergen Reactions     Haldol [Haloperidol] Other (See Comments)     Severe dystonic reaction     Sulfa Drugs             Psychiatric Examination:   /64  Pulse 79  Temp 96.5  F (35.8  C) (Tympanic)  Resp 16  Ht 1.778 m (5' 10\")  Wt 76.2 kg (168 lb)  SpO2 98%  BMI 24.11 kg/m2  Weight is 168 lbs 0 oz  Body mass index is 24.11 kg/(m^2).    Appearance:  awake, alert, adequately groomed and dressed in hospital scrubs  Attitude: copperative  Eye Contact:  fair  Mood:  good  Affect:  intensity is blunted  Speech:  clear, coherent  Psychomotor Behavior:  no evidence of tardive dyskinesia, dystonia, or tics and intact station, gait and muscle tone  Thought Process:  evidence of thought blocking present  Associations:  no loose associations  Thought Content: denies hallucinations but continues to appear as though there may be internal stimuli  Insight:  fair  Judgment:  fair  Oriented to:  time, person, and place  Attention Span and Concentration:  fair  Recent and Remote Memory:  fair  Fund of Knowledge: low-normal  Muscle Strength and Tone: normal  Gait and Station: Normal           Labs:   No results found for this or any previous visit (from the past 48 hour(s)).             Plan:   Conitnue current medications  "

## 2017-10-07 NOTE — PLAN OF CARE
Face to face end of shift report received from Laisha CASTILLO RN. Rounding completed. Patient observed in [facility].     Ruthie Martínez  10/7/2017  3:31 PM

## 2017-10-07 NOTE — PLAN OF CARE
Face to face end of shift report received from Mariann FRIAS RN. Rounding completed. [place] [facility] [place]     Laisha Roberson  10/7/2017  7:56 AM

## 2017-10-07 NOTE — PLAN OF CARE
Problem: Patient Care Overview  Goal: Individualization & Mutuality  Patient will have a realistic conversation with nurse by discharge  Patient will attend 50% fo groups daily by discharge.  Patient will not display aggressive behaviors throughout hospitalization.  Patient will sleep between 6 and 8 hours a noc   Outcome: Improving  Slept well this noc - up at approx 0200 for a snack - of juice, milk, rajeev crackers and small bag of cheddar cheese goldfish.

## 2017-10-07 NOTE — PLAN OF CARE
Problem: Patient Care Overview  Goal: Individualization & Mutuality  Patient will have a realistic conversation with nurse by discharge  Patient will attend 50% fo groups daily by discharge.  Patient will not display aggressive behaviors throughout hospitalization.  Patient will sleep between 6 and 8 hours a noc   Outcome: No Change  Patient denies all criteria. Affect is flat, stare is intense at times. States he is fine, did agree to go to groups after asking for ruled paper and was given a journal. Has been in control of aggressive behaviors, has been socializing with peers, but does not make conversation with staff, only gives minimal responses.   1250-requested and accepted Atarax 50 mg PO.  1500-requested and accepted Tylenol 650 mg PO for headache pain 6/10.

## 2017-10-08 PROCEDURE — 25000132 ZZH RX MED GY IP 250 OP 250 PS 637: Performed by: NURSE PRACTITIONER

## 2017-10-08 PROCEDURE — 12400000 ZZH R&B MH

## 2017-10-08 PROCEDURE — 99232 SBSQ HOSP IP/OBS MODERATE 35: CPT | Performed by: NURSE PRACTITIONER

## 2017-10-08 RX ORDER — BENZTROPINE MESYLATE 0.5 MG/1
0.5 TABLET ORAL 2 TIMES DAILY
Qty: 60 TABLET | Refills: 0 | Status: SHIPPED | OUTPATIENT
Start: 2017-10-08

## 2017-10-08 RX ORDER — ARIPIPRAZOLE 20 MG/1
20 TABLET ORAL DAILY
Qty: 30 TABLET | Refills: 0 | Status: SHIPPED | OUTPATIENT
Start: 2017-10-08

## 2017-10-08 RX ADMIN — BENZTROPINE MESYLATE 0.5 MG: 0.5 TABLET ORAL at 20:13

## 2017-10-08 RX ADMIN — NICOTINE POLACRILEX 4 MG: 2 GUM, CHEWING ORAL at 16:41

## 2017-10-08 RX ADMIN — ARIPIPRAZOLE 20 MG: 10 TABLET ORAL at 08:06

## 2017-10-08 RX ADMIN — BENZTROPINE MESYLATE 0.5 MG: 0.5 TABLET ORAL at 08:06

## 2017-10-08 ASSESSMENT — ACTIVITIES OF DAILY LIVING (ADL)
DRESS: SCRUBS (BEHAVIORAL HEALTH)
DRESS: SCRUBS (BEHAVIORAL HEALTH);INDEPENDENT
GROOMING: INDEPENDENT
LAUNDRY: UNABLE TO COMPLETE
ORAL_HYGIENE: INDEPENDENT
ORAL_HYGIENE: INDEPENDENT
GROOMING: INDEPENDENT
LAUNDRY: UNABLE TO COMPLETE

## 2017-10-08 NOTE — PLAN OF CARE
Problem: Patient Care Overview  Goal: Individualization & Mutuality  Patient will have a realistic conversation with nurse by discharge  Patient will attend 50% fo groups daily by discharge.  Patient will not display aggressive behaviors throughout hospitalization.  Patient will sleep between 6 and 8 hours a noc   Outcome: Improving  Slept majority of the noc without issue - behavior appropriate with staff and peers

## 2017-10-08 NOTE — PROGRESS NOTES
[facility]  Psychiatric Progress Note      Impression:   This is a 32 year old male with a history of schizophrenia. Shay is up and about on the unit off and on. He listens to music much of the time. He denies any hallucinations. He reports he is able to concentrate better than he has previously. He also reports that he has an easier time sleeping and focusing on a conversation. Shay does appear a bit preoccupied at times and some of his responses are a bit delayed. He feels he is ready for discharge and feels he will continue to take this medication as he feels better on this than his previous medications.       Educated regarding medication indications, risks, benefits, side effects, contraindications and possible interactions. Verbally expressed understanding.        DIagnoses:     schizophrenia    Attestation:  Patient has been seen and evaluated by me,  Bryanna Justice NP          Interim History:   The patient's care was discussed with the treatment team and chart notes were reviewed.          Medications:     Current Facility-Administered Medications Ordered in Epic   Medication Dose Route Frequency Last Rate Last Dose     hydrOXYzine (ATARAX) tablet 25-50 mg  25-50 mg Oral Q4H PRN         acetaminophen (TYLENOL) tablet 650 mg  650 mg Oral Q4H PRN         alum & mag hydroxide-simethicone (MYLANTA ES/MAALOX  ES) suspension 30 mL  30 mL Oral Q4H PRN         magnesium hydroxide (MILK OF MAGNESIA) suspension 30 mL  30 mL Oral At Bedtime PRN         bisacodyl (DULCOLAX) Suppository 10 mg  10 mg Rectal Daily PRN         traZODone (DESYREL) tablet 50 mg  50 mg Oral At Bedtime PRN         OLANZapine (zyPREXA) tablet 10 mg  10 mg Oral TID PRN   10 mg at 10/04/17 1144    Or     OLANZapine (zyPREXA) injection 10 mg  10 mg Intramuscular TID PRN         nicotine polacrilex (NICORETTE) gum 2-4 mg  2-4 mg Buccal Q1H PRN         risperiDONE (risperDAL) tablet 3 mg  3 mg Oral BID   3 mg at 10/05/17 0806      "ARIPiprazole (ABILIFY) tablet 10 mg  10 mg Oral Daily   10 mg at 10/05/17 0805     diphenhydrAMINE (BENADRYL) capsule 50 mg  50 mg Oral Q6H PRN         No current Epic-ordered outpatient prescriptions on file.              10 point ROS negative        Allergies:     Allergies   Allergen Reactions     Haldol [Haloperidol] Other (See Comments)     Severe dystonic reaction     Sulfa Drugs             Psychiatric Examination:   /60  Pulse 81  Temp 97.1  F (36.2  C) (Tympanic)  Resp 16  Ht 1.778 m (5' 10\")  Wt 75.8 kg (167 lb 1.6 oz)  SpO2 98%  BMI 23.98 kg/m2  Weight is 167 lbs 1.6 oz  Body mass index is 23.98 kg/(m^2).    Appearance:  awake, alert, adequately groomed and dressed in hospital scrubs  Attitude: copperative  Eye Contact:  fair  Mood:  good  Affect:  intensity is blunted  Speech:  clear, coherent  Psychomotor Behavior:  no evidence of tardive dyskinesia, dystonia, or tics and intact station, gait and muscle tone  Thought Process:  evidence of thought blocking present  Associations:  no loose associations  Thought Content: denies hallucinations but continues to appear as though there may be internal stimuli  Insight:  fair  Judgment:  fair  Oriented to:  time, person, and place  Attention Span and Concentration:  fair  Recent and Remote Memory:  fair  Fund of Knowledge: low-normal  Muscle Strength and Tone: normal  Gait and Station: Normal           Labs:   No results found for this or any previous visit (from the past 48 hour(s)).             Plan:   Conitnue current medications    ELOS: Will likely discharge tomorrow after appropriate transportation and referrals can be made.  "

## 2017-10-08 NOTE — PLAN OF CARE
"Problem: Patient Care Overview  Goal: Individualization & Mutuality  Patient will have a realistic conversation with nurse by discharge  Patient will attend 50% fo groups daily by discharge.  Patient will not display aggressive behaviors throughout hospitalization.  Patient will sleep between 6 and 8 hours a noc   Outcome: No Change  Patient pleasant and cooperative with nursing assessment. Denies depression, SI, HI, and pain. Agrees to contact staff if having thoughts of self harm.  Rates anxiety as \"1.1\" then laughs. States that he feels like \"the voices are dying down, I like it here\". Up in lounge listening to music most of shift. Compliant with medications. Patient did not display any anger/aggression this shift. Did not attend groups.       "

## 2017-10-08 NOTE — PLAN OF CARE
Face to face end of shift report received from Laisha CASTILLO RN. Rounding completed. [place] in [facility]     Ruthie Martínez  10/8/2017  3:38 PM

## 2017-10-08 NOTE — PLAN OF CARE
Face to face end of shift report received from Mariann FRIAS RN. Rounding completed. Patient observed in room.     Laisha Roberson  10/8/2017  7:49 AM

## 2017-10-08 NOTE — PLAN OF CARE
Problem: Patient Care Overview  Goal: Individualization & Mutuality  Patient will have a realistic conversation with nurse by discharge  Patient will attend 50% fo groups daily by discharge.  Patient will not display aggressive behaviors throughout hospitalization.  Patient will sleep between 6 and 8 hours a noc   Outcome: No Change  Patient cooperative with assessment questioning. Denies all criteria. Has been out on the open unit, listening to music via headphones, not socializing with peers. Affect is flat, responses are minimal, does make eye contact. Has been appropriate with staff and peers.

## 2017-10-08 NOTE — PLAN OF CARE
"Problem: Patient Care Overview  Goal: Individualization & Mutuality  Patient will have a realistic conversation with nurse by discharge  Patient will attend 50% fo groups daily by discharge.  Patient will not display aggressive behaviors throughout hospitalization.  Patient will sleep between 6 and 8 hours a noc   Outcome: No Change  Patient pleasant and cooperative with nursing assessment. Denies anxiety, depression, SI, and HI. Agrees to contact staff if having thoughts of self harm. Reports pain 9/10 in his teeth so he received PRN Tylenol 650 mg at 1903 and when reassessed patient reports decrease in pain to 2/10. Patient reports voices are \"about the same\" today as they were yesterday. Patient asking staff if he can keep the hospital scrubs when he leaves. Told patient that he was unable to and asked him why he would like to keep them. He told this writer \"something to remember this place by. You learn something new about yourself when you are hospitalized.\". Patient reports feeling like the medications are helping him and denies any side effects. Patient compliant with medications and did not display any inappropriate behavior or staring this shift. Does not attend groups. Did not display any aggressive behavior this shift.       "

## 2017-10-08 NOTE — PLAN OF CARE
Face to face end of shift report received from EROS Hendricks. Rounding completed. Patient observed. Lying in prone position - eyes closed - non-labored breathing noted.    Mariann Bell  10/8/2017  4:02 AM

## 2017-10-09 VITALS
BODY MASS INDEX: 23.92 KG/M2 | HEART RATE: 79 BPM | RESPIRATION RATE: 16 BRPM | DIASTOLIC BLOOD PRESSURE: 74 MMHG | WEIGHT: 167.1 LBS | TEMPERATURE: 97.5 F | SYSTOLIC BLOOD PRESSURE: 116 MMHG | HEIGHT: 70 IN | OXYGEN SATURATION: 100 %

## 2017-10-09 PROCEDURE — 25000128 H RX IP 250 OP 636: Performed by: NURSE PRACTITIONER

## 2017-10-09 PROCEDURE — 99239 HOSP IP/OBS DSCHRG MGMT >30: CPT | Performed by: NURSE PRACTITIONER

## 2017-10-09 PROCEDURE — 90732 PPSV23 VACC 2 YRS+ SUBQ/IM: CPT | Performed by: NURSE PRACTITIONER

## 2017-10-09 PROCEDURE — 90686 IIV4 VACC NO PRSV 0.5 ML IM: CPT | Performed by: NURSE PRACTITIONER

## 2017-10-09 PROCEDURE — 25000132 ZZH RX MED GY IP 250 OP 250 PS 637: Performed by: NURSE PRACTITIONER

## 2017-10-09 RX ADMIN — ARIPIPRAZOLE 20 MG: 10 TABLET ORAL at 08:10

## 2017-10-09 RX ADMIN — PNEUMOCOCCAL VACCINE POLYVALENT 0.5 ML
25; 25; 25; 25; 25; 25; 25; 25; 25; 25; 25; 25; 25; 25; 25; 25; 25; 25; 25; 25; 25; 25; 25 INJECTION, SOLUTION INTRAMUSCULAR; SUBCUTANEOUS at 12:19

## 2017-10-09 RX ADMIN — NICOTINE POLACRILEX 4 MG: 2 GUM, CHEWING ORAL at 11:28

## 2017-10-09 RX ADMIN — ALUMINUM HYDROXIDE, MAGNESIUM HYDROXIDE, AND DIMETHICONE 30 ML: 400; 400; 40 SUSPENSION ORAL at 06:01

## 2017-10-09 RX ADMIN — BENZTROPINE MESYLATE 0.5 MG: 0.5 TABLET ORAL at 08:10

## 2017-10-09 RX ADMIN — INFLUENZA A VIRUS A/MICHIGAN/45/2015 X-275 (H1N1) ANTIGEN (FORMALDEHYDE INACTIVATED), INFLUENZA A VIRUS A/HONG KONG/4801/2014 X-263B (H3N2) ANTIGEN (FORMALDEHYDE INACTIVATED), INFLUENZA B VIRUS B/PHUKET/3073/2013 ANTIGEN (FORMALDEHYDE INACTIVATED), AND INFLUENZA B VIRUS B/BRISBANE/60/2008 ANTIGEN (FORMALDEHYDE INACTIVATED) 0.5 ML: 15; 15; 15; 15 INJECTION, SUSPENSION INTRAMUSCULAR at 12:29

## 2017-10-09 NOTE — PLAN OF CARE
Face to face end of shift report received from EROS Hendricks. Rounding completed. Patient observed. Lying in a supine position - eyes closed - nn-labored breathing noted.     Mariann Bell  10/9/2017  3:55 AM

## 2017-10-09 NOTE — PLAN OF CARE
Discharge instructions, including; demographic sheet, psychiatric evaluation, discharge summary, and AVS were faxed to these next level of care providers [facility] and [place]

## 2017-10-09 NOTE — PLAN OF CARE
Face to face end of shift report received from Mariann COONEY. Rounding completed. [place] [facility]     Lorena Wiggins  10/9/2017  7:40 AM

## 2017-10-09 NOTE — PLAN OF CARE
"Problem: Patient Care Overview  Goal: Individualization & Mutuality  Patient will have a realistic conversation with nurse by discharge  Patient will attend 50% fo groups daily by discharge.  Patient will not display aggressive behaviors throughout hospitalization.  Patient will sleep between 6 and 8 hours a noc   Outcome: Therapy, progress toward functional goals as expected  Met with pt this AM he denies SI HI bee, depression anxiety and pain.  He verbalizes feeling better, and hopes to be discharged. He states \" I live with my mom and she is a big support to me, also my PO is a good support for me\"  He is actively participating in unit programming, and he is encouraged to shower.  Supplies provided. He verbalizes that he is eating and sleeping ok and B/B working as they should.  He has no questions or concerns at this time.  He is compliant with his AM medications.       1020:  Pt's mom called and reports that Kirsten Tavera from the Care Program will be leaving [place] at about noon to come  patient.      Pneumococcal Injection administered in right deltoid per pt request. Pt tolerated injection with no s/s of side effects. Pt also received the Flu shot in the left deltoid per pt request no s/s of side effects.    Pt discharged from unit to home transported by his .  He was sent home with his belongings and medications. He is pleasant and cooperative. No questions or concerns about his discharge follow up plan.  He did say that he does have many supports if he needs.    "

## 2017-10-09 NOTE — DISCHARGE SUMMARY
Psychiatric Discharge Summary    Shay Carrera MRN# [id number]   Age: 32 year old YOB: 1985     Date of Admission:  10/4/2017  Date of Discharge:  10/9/2017  Admitting Physician:  Sreedhar Hall MD  Discharge Physician:  Bryanna Justice NP          Event Leading to Hospitalization and Hospital Stay   Shay Carrera is a 32 year old single  male who presented via [facility] with his PO due to his ongoing bizarre behaviors. Shay has been disorganized behavior, delusional beliefs and hallucinations. Shay believes his father or grandfather own the [employer]. He carries around large amounts of monopoly money and believes that the monopoly man has been killed. Shay is recently out of correction after serving time for [employer] and [employer]. He lives with his mother who is also reportedly his guardian. Shay is quite sleepy due to getting a medication earlier in the day. This information is taken from his chart. Shay was able to provide some information and utter agreement or clarify briefly with the information.   The dual neuroleptics of Olanzapine and Risperidone were stopped and Shay was then started on Abilify. He felt this medication had worked better than the previous combination he has been on. He did complain of some muscle stiffness however so cogentin was started. This was beneficial. Shay reported a decrease in auditory and visual hallucinations. He still appears to be somewhat preoccupied at times but he is able to carry on a linear conversation. Shay is also spontaneous in conversation which is a vast improvement since admission. He will follow up with his PO and mental Health court this week.               At time of discharge, there is no evidence that patient is in immediate danger of self or others.        DIagnoses:     schizophrenia         Labs:     Results for orders placed or performed during the hospital encounter of 03/21/15   CT Head w/o Contrast     Narrative    CT SCAN OF THE BRAIN WITHOUT CONTRAST    REPORT: The ventricular system is normal in size. There are no masses,  ventricular shifts, extracerebral collections. Brainstem and  cerebellum appear normal.  Cranial vault is intact.    IMPRESSION: NORMAL BRAIN.  Exam Date: Mar 22, 2015 09:44:00 AM  Author: ALLAN QUESADA  This report is final and signed              Discharge Medications:     Current Discharge Medication List      START taking these medications    Details   benztropine (COGENTIN) 0.5 MG tablet Take 1 tablet (0.5 mg) by mouth 2 times daily  Qty: 60 tablet, Refills: 0    Associated Diagnoses: Disorganized schizophrenia (H)      ARIPiprazole (ABILIFY) 20 MG tablet Take 1 tablet (20 mg) by mouth daily  Qty: 30 tablet, Refills: 0    Associated Diagnoses: Disorganized schizophrenia (H)         STOP taking these medications       RisperiDONE (RISPERDAL PO) Comments:   Reason for Stopping:         RisperiDONE (RISPERDAL PO) Comments:   Reason for Stopping:                 Justification for dual anti-psychotic use: Not applicable       Psychiatric Examination:   Appearance:  awake, alert, adequately groomed and dressed in hospital scrubs  Attitude:  cooperative  Eye Contact:  fair  Mood:  better  Affect:  appropriate and in normal range and mood congruent  Speech:  clear, coherent  Psychomotor Behavior:  no evidence of tardive dyskinesia, dystonia, or tics and intact station, gait and muscle tone  Thought Process:  linear and goal oriented  Associations:  no loose associations  Thought Content:  no evidence of suicidal ideation or homicidal ideation, no evidence of psychotic thought and patient appears to be responding to internal stimuli  Insight:  fair  Judgment:  fair  Oriented to:  time, person, and place  Attention Span and Concentration:  fair  Recent and Remote Memory:  fair  Fund of Knowledge: low-normal  Muscle Strength and Tone: normal  Gait and Station: Normal  Perception: May have some  internal stimuli       Discharge Plan:     Psychiatry Follow-up:      [facility]  Therapy- Oziel- October 25th @1:30pm  PCP- Dr. Gregory- October 9th @9am, left message to reschedule    - Yennifer Tavera- As arranged [phone]   [address] [facility] [address]  Phone: [phone] Fax: [phone]     [facility] [address]- Fridays   - Sreedhar Alexander- [phone]/ [phone]  [address] [place] [address]  Phone [phone]   Fax- [phone]     [facility]  [address], [address]  [place] [address] [phone]  Fax: [phone]     [facility]  [address] [place], [place]  Phone - [phone] - Client number  Fax - [phone]         Attestation:  The patient has been seen and evaluated by me,  Bryanna Justice, [facility]         Discharge Services Provided:    60 minutes spent on discharge services, including:  Final examination of patient.  Review and discussion of Hospital stay.  Instructions for continued outpatient care/goals.  Preparation of discharge records.  Preparation of medications refills and new prescriptions.  Preparation of Applicable referral forms.

## 2017-10-09 NOTE — DISCHARGE INSTRUCTIONS
Behavioral Discharge Planning and Instructions    Summary: Shay was admitted to  with Hallucinations/Voices telling him to harm self/others - non-med compliance    Main Diagnosis:  schizophrenia    Major Treatments, Procedures and Findings: Stabilize with medications, connect with community programs.    Symptoms to Report: feeling more aggressive, increased confusion, losing more sleep, mood getting worse or thoughts of suicide    Lifestyle Adjustment: Take all medications as prescribed, meet with doctor/ medication provider, out patient therapist, , and [facility] worker as scheduled. Abstain from alcohol or any unprescribed drugs.    Psychiatry Follow-up:     [facility]  Therapy- Oziel- October 25th @1:30pm  PCP- Dr. Gregory- October 9th @9am, left message to reschedule    - Yennifer Tavera- As arranged [phone]   [address] [place] [address]  Phone: [phone] Fax: [phone]    [facility] [address]- Fridays   - Sreedhar Alexander- [phone]/ [phone]  [address] [place] [address]  Phone [phone]   Fax- [phone]    [facility]  [address] [facility] [address], [address]  [place] [address]  [phone]  Fax: [phone]    [facility] Crisis Stabil[facility]  [address] [place] [address], [place]  Phone - [phone] - Client number  Fax - [phone]      Resources:   Crisis Intervention: 357.397.5444 or 123-356-5679 (TTY: 494.348.8603).  Call anytime for help.  National [place] on Mental Illness (www.[place].osbaldo.org): 925.786.2812 or 962-076-9393.  Alcoholics Anonymous (www.alcoholics-anonymous.org): Check your phone book for your local chapter.  Suicide Awareness Voices of Education (SAVE) (www.save.org): 957-371-JIUV (8786)  National Suicide Prevention Line (www.mentalhealthmn.org): 403-612-BTYA (7118)  Mental Health Consumer/Survivor Network of [place] (www.mhcsn.net): 857.912.6530 or 832-383-2193  Mental  Health Association of [place] (www.mentalhealth.org): 650.826.4008 or 722-939-5772    General Medication Instructions:   See your medication sheet(s) for instructions.   Take all medicines as directed.  Make no changes unless your doctor suggests them.   Go to all your doctor visits.  Be sure to have all your required lab tests. This way, your medicines can be refilled on time.  Do not use any drugs not prescribed by your doctor.  Avoid alcohol.    [place] Area:  [facility], Crisis stabilization [facility]- [phone]  [facility] Crisis Line: 1-149.414.9352  [facility]: 346-3094  Sexual Assault Program [facility]: 229.193.4561 or 1-362.723.1526  [facility]: 1-163.786.4396 Ext: 279       Calls answered Mon-Fri-8:00 am--4:30 pm    [place]:  [facility]: 8-708-362-7382  [facility] first call for help: 2-641-680-5371  [facility]:  (202) 556-4608      [place] Area:  Warm Line: 1-809.165.9071       Calls answered Tuesday--Saturday 4:00 pm--10:00 pm  [facility] Crisis Line - 751.402.7992  [facility] 210-878-2612    [place] Statewide:  [place] Crisis and Referral Services: 1-873.397.1017  National Suicide Prevention Lifeline: 0-449-601-TALK (8082)   - kjs4kyer- Text  Life  to [address]  First Call for Help: 2-1-1  [facility] Helpline- 7-583-HKRQ-HELP

## 2017-10-09 NOTE — PLAN OF CARE
Problem: Patient Care Overview  Goal: Discharge Needs Assessment  Outcome: Adequate for Discharge Date Met:  10/09/17  Pt is discharging at the recommendation of the treatment team. Pt is discharging to home with mom transported by . Pt denies having any thoughts of hurting themself or anyone else. Pt denies anxiety or depression. Pt has follow up with [facility] and [facility]. Discharge instructions, including; demographic sheet, psychiatric evaluation, discharge summary, and AVS were faxed to these next level of care providers by discharge planner.

## 2017-10-09 NOTE — PLAN OF CARE
Discharge Note    Patient Discharged to home on 10/9/2017 12:50 PM via Private Car accompanied by LORENZA Tavera.     Patient informed of discharge instructions in AVS. patient verbalizes understanding and denies having any questions pertaining to AVS. Patient stable at time of discharge. Patient denies SI, HI, and thoughts of self harm at time of discharge. All personal belongings returned to patient. Discharge prescriptions sent to [facility] pharmacy via electronic communication. Psych evaluation, history and physical, AVS, and discharge summary faxed to next level of care- by unit discharge planner.     Lorena Wiggins  10/9/2017  12:50 PM

## 2017-10-09 NOTE — PLAN OF CARE
"Problem: Patient Care Overview  Goal: Individualization & Mutuality  Patient will have a realistic conversation with nurse by discharge  Patient will attend 50% fo groups daily by discharge.  Patient will not display aggressive behaviors throughout hospitalization.  Patient will sleep between 6 and 8 hours a noc   Outcome: No Change  Slept all noc without issue - administered maalox  30 cc po at 0600 per his request for c/o \"heartburn\"  Has been polite and appropriate to older female staff - continues to be flirtatious with younger staff - has been 'preening' in the [facility] area - doing push ups using a chair - doing stretches in front of the nurses station window where the staff is remaining - as she is 'leary' of him.  He is currently watching tv and did report relief with the maalox.      "
